# Patient Record
Sex: FEMALE | Race: OTHER | HISPANIC OR LATINO | URBAN - METROPOLITAN AREA
[De-identification: names, ages, dates, MRNs, and addresses within clinical notes are randomized per-mention and may not be internally consistent; named-entity substitution may affect disease eponyms.]

---

## 2022-03-25 ENCOUNTER — EMERGENCY (EMERGENCY)
Facility: HOSPITAL | Age: 72
LOS: 1 days | Discharge: HOME | End: 2022-03-25
Attending: EMERGENCY MEDICINE
Payer: COMMERCIAL

## 2022-03-25 VITALS
SYSTOLIC BLOOD PRESSURE: 134 MMHG | TEMPERATURE: 98 F | RESPIRATION RATE: 17 BRPM | HEIGHT: 64 IN | HEART RATE: 92 BPM | WEIGHT: 139.99 LBS | OXYGEN SATURATION: 96 % | DIASTOLIC BLOOD PRESSURE: 80 MMHG

## 2022-03-25 LAB
ALBUMIN SERPL ELPH-MCNC: 3.3 G/DL — LOW (ref 3.5–5)
ALP SERPL-CCNC: 101 U/L — SIGNIFICANT CHANGE UP (ref 40–120)
ALT FLD-CCNC: 30 U/L DA — SIGNIFICANT CHANGE UP (ref 10–60)
ANION GAP SERPL CALC-SCNC: 5 MMOL/L — SIGNIFICANT CHANGE UP (ref 5–17)
APPEARANCE UR: CLEAR — SIGNIFICANT CHANGE UP
AST SERPL-CCNC: 27 U/L — SIGNIFICANT CHANGE UP (ref 10–40)
BASOPHILS # BLD AUTO: 0.05 K/UL — SIGNIFICANT CHANGE UP (ref 0–0.2)
BASOPHILS NFR BLD AUTO: 0.7 % — SIGNIFICANT CHANGE UP (ref 0–2)
BILIRUB SERPL-MCNC: 0.4 MG/DL — SIGNIFICANT CHANGE UP (ref 0.2–1.2)
BILIRUB UR-MCNC: NEGATIVE — SIGNIFICANT CHANGE UP
BUN SERPL-MCNC: 12 MG/DL — SIGNIFICANT CHANGE UP (ref 7–18)
CALCIUM SERPL-MCNC: 9 MG/DL — SIGNIFICANT CHANGE UP (ref 8.4–10.5)
CHLORIDE SERPL-SCNC: 112 MMOL/L — HIGH (ref 96–108)
CO2 SERPL-SCNC: 24 MMOL/L — SIGNIFICANT CHANGE UP (ref 22–31)
COLOR SPEC: YELLOW — SIGNIFICANT CHANGE UP
CREAT SERPL-MCNC: 1.25 MG/DL — SIGNIFICANT CHANGE UP (ref 0.5–1.3)
DIFF PNL FLD: NEGATIVE — SIGNIFICANT CHANGE UP
EGFR: 46 ML/MIN/1.73M2 — LOW
EOSINOPHIL # BLD AUTO: 0.43 K/UL — SIGNIFICANT CHANGE UP (ref 0–0.5)
EOSINOPHIL NFR BLD AUTO: 6.2 % — HIGH (ref 0–6)
GLUCOSE SERPL-MCNC: 125 MG/DL — HIGH (ref 70–99)
GLUCOSE UR QL: 1000 MG/DL
HCT VFR BLD CALC: 42.9 % — SIGNIFICANT CHANGE UP (ref 34.5–45)
HGB BLD-MCNC: 14.2 G/DL — SIGNIFICANT CHANGE UP (ref 11.5–15.5)
IMM GRANULOCYTES NFR BLD AUTO: 0.3 % — SIGNIFICANT CHANGE UP (ref 0–1.5)
KETONES UR-MCNC: NEGATIVE — SIGNIFICANT CHANGE UP
LEUKOCYTE ESTERASE UR-ACNC: NEGATIVE — SIGNIFICANT CHANGE UP
LYMPHOCYTES # BLD AUTO: 1.71 K/UL — SIGNIFICANT CHANGE UP (ref 1–3.3)
LYMPHOCYTES # BLD AUTO: 24.5 % — SIGNIFICANT CHANGE UP (ref 13–44)
MCHC RBC-ENTMCNC: 30.7 PG — SIGNIFICANT CHANGE UP (ref 27–34)
MCHC RBC-ENTMCNC: 33.1 GM/DL — SIGNIFICANT CHANGE UP (ref 32–36)
MCV RBC AUTO: 92.9 FL — SIGNIFICANT CHANGE UP (ref 80–100)
MONOCYTES # BLD AUTO: 0.57 K/UL — SIGNIFICANT CHANGE UP (ref 0–0.9)
MONOCYTES NFR BLD AUTO: 8.2 % — SIGNIFICANT CHANGE UP (ref 2–14)
NEUTROPHILS # BLD AUTO: 4.21 K/UL — SIGNIFICANT CHANGE UP (ref 1.8–7.4)
NEUTROPHILS NFR BLD AUTO: 60.1 % — SIGNIFICANT CHANGE UP (ref 43–77)
NITRITE UR-MCNC: NEGATIVE — SIGNIFICANT CHANGE UP
NRBC # BLD: 0 /100 WBCS — SIGNIFICANT CHANGE UP (ref 0–0)
PH UR: 6 — SIGNIFICANT CHANGE UP (ref 5–8)
PLATELET # BLD AUTO: 243 K/UL — SIGNIFICANT CHANGE UP (ref 150–400)
POTASSIUM SERPL-MCNC: 4.1 MMOL/L — SIGNIFICANT CHANGE UP (ref 3.5–5.3)
POTASSIUM SERPL-SCNC: 4.1 MMOL/L — SIGNIFICANT CHANGE UP (ref 3.5–5.3)
PROT SERPL-MCNC: 6.6 G/DL — SIGNIFICANT CHANGE UP (ref 6–8.3)
PROT UR-MCNC: 15
RBC # BLD: 4.62 M/UL — SIGNIFICANT CHANGE UP (ref 3.8–5.2)
RBC # FLD: 15.2 % — HIGH (ref 10.3–14.5)
SARS-COV-2 RNA SPEC QL NAA+PROBE: SIGNIFICANT CHANGE UP
SODIUM SERPL-SCNC: 141 MMOL/L — SIGNIFICANT CHANGE UP (ref 135–145)
SP GR SPEC: 1.01 — SIGNIFICANT CHANGE UP (ref 1.01–1.02)
TROPONIN I, HIGH SENSITIVITY RESULT: <3 NG/L — SIGNIFICANT CHANGE UP
UROBILINOGEN FLD QL: NEGATIVE — SIGNIFICANT CHANGE UP
WBC # BLD: 6.99 K/UL — SIGNIFICANT CHANGE UP (ref 3.8–10.5)
WBC # FLD AUTO: 6.99 K/UL — SIGNIFICANT CHANGE UP (ref 3.8–10.5)

## 2022-03-25 PROCEDURE — 84484 ASSAY OF TROPONIN QUANT: CPT

## 2022-03-25 PROCEDURE — 71046 X-RAY EXAM CHEST 2 VIEWS: CPT

## 2022-03-25 PROCEDURE — 99284 EMERGENCY DEPT VISIT MOD MDM: CPT | Mod: 25

## 2022-03-25 PROCEDURE — 80053 COMPREHEN METABOLIC PANEL: CPT

## 2022-03-25 PROCEDURE — 36415 COLL VENOUS BLD VENIPUNCTURE: CPT

## 2022-03-25 PROCEDURE — 96374 THER/PROPH/DIAG INJ IV PUSH: CPT

## 2022-03-25 PROCEDURE — 87635 SARS-COV-2 COVID-19 AMP PRB: CPT

## 2022-03-25 PROCEDURE — 81001 URINALYSIS AUTO W/SCOPE: CPT

## 2022-03-25 PROCEDURE — 71046 X-RAY EXAM CHEST 2 VIEWS: CPT | Mod: 26

## 2022-03-25 PROCEDURE — 99284 EMERGENCY DEPT VISIT MOD MDM: CPT

## 2022-03-25 PROCEDURE — 85025 COMPLETE CBC W/AUTO DIFF WBC: CPT

## 2022-03-25 RX ORDER — SODIUM CHLORIDE 9 MG/ML
1000 INJECTION INTRAMUSCULAR; INTRAVENOUS; SUBCUTANEOUS ONCE
Refills: 0 | Status: COMPLETED | OUTPATIENT
Start: 2022-03-25 | End: 2022-03-25

## 2022-03-25 RX ORDER — ACETAMINOPHEN 500 MG
650 TABLET ORAL ONCE
Refills: 0 | Status: COMPLETED | OUTPATIENT
Start: 2022-03-25 | End: 2022-03-25

## 2022-03-25 RX ORDER — KETOROLAC TROMETHAMINE 30 MG/ML
15 SYRINGE (ML) INJECTION ONCE
Refills: 0 | Status: DISCONTINUED | OUTPATIENT
Start: 2022-03-25 | End: 2022-03-25

## 2022-03-25 RX ADMIN — Medication 650 MILLIGRAM(S): at 16:14

## 2022-03-25 RX ADMIN — Medication 15 MILLIGRAM(S): at 16:14

## 2022-03-25 RX ADMIN — SODIUM CHLORIDE 1000 MILLILITER(S): 9 INJECTION INTRAMUSCULAR; INTRAVENOUS; SUBCUTANEOUS at 16:15

## 2022-03-25 NOTE — ED PROVIDER NOTE - CLINICAL SUMMARY MEDICAL DECISION MAKING FREE TEXT BOX
pt presents with myalgias associated with rhinorrhea and diarrhea.  Afebrile.  abdomen soft on exam.  concern for infectious process.  Will check labs, CXR, UA, COVID swab, and reassess.

## 2022-03-25 NOTE — ED PROVIDER NOTE - NSFOLLOWUPINSTRUCTIONS_ED_ALL_ED_FT
Viral Illness, Adult      Viruses are tiny germs that can get into a person's body and cause illness. There are many different types of viruses, and they cause many types of illness. Viral illnesses can range from mild to severe. They can affect various parts of the body.    Short-term conditions that are caused by a virus include colds and the flu (influenza). Long-term conditions that are caused by a virus include herpes, shingles, and HIV (human immunodeficiency virus) infection. A few viruses have been linked to certain cancers.      What are the causes?    Many types of viruses can cause illness. Viruses invade cells in your body, multiply, and cause the infected cells to work abnormally or die. When these cells die, they release more of the virus. When this happens, you develop symptoms of the illness, and the virus continues to spread to other cells. If the virus takes over the function of the cell, it can cause the cell to divide and grow out of control. This happens when a virus causes cancer.    Different viruses get into the body in different ways. You can get a virus by:  •Swallowing food or water that has come in contact with the virus (is contaminated).      •Breathing in droplets that have been coughed or sneezed into the air by an infected person.      •Touching a surface that has been contaminated with the virus and then touching your eyes, nose, or mouth.      •Being bitten by an insect or animal that carries the virus.      •Having sexual contact with a person who is infected with the virus.      •Being exposed to blood or fluids that contain the virus, either through an open cut or during a transfusion.      If a virus enters your body, your body's defense system (immune system) will try to fight the virus. You may be at higher risk for a viral illness if your immune system is weak.      What are the signs or symptoms?    You may have these symptoms, depending on the type of virus and the location of the cells that it invades:•Cold and flu viruses:  •Fever.      •Headache.      •Sore throat.      •Muscle aches.      •Stuffy nose (nasal congestion).      •Cough.      •Digestive system (gastrointestinal) viruses:  •Fever.      •Pain in the abdomen.      •Nausea.      •Diarrhea.      •Liver viruses (hepatitis):  •Loss of appetite.      •Tiredness.      •Skin or the white parts of your eyes turning yellow (jaundice).      •Brain and spinal cord viruses:  •Fever.      •Headache.      •Stiff neck.      •Nausea and vomiting.      •Confusion or sleepiness.      •Skin viruses:  •Warts.      •Itching.      •Rash.      •Sexually transmitted viruses:  •Discharge.      •Swelling.      •Redness.      •Rash.          How is this diagnosed?    This condition may be diagnosed based on one or more of the following:  •Symptoms.      •Medical history.      •Physical exam.      •Blood test, sample of mucus from your lungs (sputum sample), stool sample, or a swab of body fluids or a skin sore (lesion).        How is this treated?    Viruses can be hard to treat because they live within cells. Antibiotic medicines do not treat viruses because these medicines do not get inside cells. Treatment for a viral illness may include:  •Resting and drinking plenty of fluids.      •Medicines to relieve symptoms. These can include over-the-counter medicine for pain and fever, medicines for cough or congestion, and medicines to relieve diarrhea.      •Antiviral medicines. These medicines are available only for certain types of viruses.      Some viral illnesses can be prevented with vaccinations. A common example is the flu shot.      Follow these instructions at home:    Medicines     •Take over-the-counter and prescription medicines only as told by your health care provider.      •If you were prescribed an antiviral medicine, take it as told by your health care provider. Do not stop taking the antiviral even if you start to feel better.    •Be aware of when antibiotics are needed and when they are not needed. Antibiotics do not treat viruses. You may get an antibiotic if your health care provider thinks that you may have, or are at risk for, a bacterial infection and you have a viral infection.  •Do not ask for an antibiotic prescription if you have been diagnosed with a viral illness. Antibiotics will not make your illness go away faster.      •Frequently taking antibiotics when they are not needed can lead to antibiotic resistance. When this develops, the medicine no longer works against the bacteria that it normally fights.          General instructions      •Drink enough fluids to keep your urine pale yellow.      •Rest as much as possible.      •Return to your normal activities as told by your health care provider. Ask your health care provider what activities are safe for you.      •Keep all follow-up visits as told by your health care provider. This is important.        How is this prevented?     To reduce your risk of viral illness:  •Wash your hands often with soap and water for at least 20 seconds. If soap and water are not available, use hand .      •Avoid touching your nose, eyes, and mouth, especially if you have not washed your hands recently.      •If anyone in your household has a viral infection, clean all household surfaces that may have been in contact with the virus. Use soap and hot water. You may also use bleach that you have added water to (diluted).      •Stay away from people who are sick with symptoms of a viral infection.      •Do not share items such as toothbrushes and water bottles with other people.      •Keep your vaccinations up to date. This includes getting a yearly flu shot.      •Eat a healthy diet and get plenty of rest.        Contact a health care provider if:    •You have symptoms of a viral illness that do not go away.      •Your symptoms come back after going away.      •Your symptoms get worse.        Get help right away if you have:    •Trouble breathing.      •A severe headache or a stiff neck.      •Severe vomiting or pain in your abdomen.      These symptoms may represent a serious problem that is an emergency. Do not wait to see if the symptoms will go away. Get medical help right away. Call your local emergency services (911 in the [place] Do not drive yourself to the hospital.       Summary    •Viruses are types of germs that can get into a person's body and cause illness. Viral illnesses can range from mild to severe. They can affect various parts of the body.      •Viruses can be hard to treat. There are medicines to relieve symptoms, and there are some antiviral medicines.      •If you were prescribed an antiviral medicine, take it as told by your health care provider. Do not stop taking the antiviral even if you start to feel better.      •Contact a health care provider if you have symptoms of a viral illness that do not go away.      This information is not intended to replace advice given to you by your health care provider. Make sure you discuss any questions you have with your health care provider.      Document Revised: 05/03/2021 Document Reviewed: 10/27/2020    Elsevier Patient Education © 2022 [employer]

## 2022-03-25 NOTE — ED PROVIDER NOTE - PATIENT PORTAL LINK FT
You can access the FollowMyHealth Patient Portal offered by [facility] by registering at the following website: [email or web address]. By joining [facility]’s FollowMyHealth portal, you will also be able to view your health information using other applications (apps) compatible with our system.

## 2022-03-25 NOTE — ED ADULT TRIAGE NOTE - DATE OF LAST VACCINATION
Home o2 ordered through [facility] [place]. They will bring a portable tank for pt.   [facility]  Ph: [phone]  Fax: [phone]  Electronically signed by Azul Contreras on 1/26/2021 at 12:42 PM 25-Feb-2022

## 2022-03-25 NOTE — ED PROVIDER NOTE - OBJECTIVE STATEMENT
[age]y/o female with PMHx of DM and HLD presents with myalgias, rhinorrhea, and diarrhea since yesterday.  Patient is COVID vaccinated and has received a booster.  As per  patient, yesterday she awoke with pain to her joints (arms and legs).  She was also having a lot of rhinorrhea.  Today, the joint pain has continued, and now patient with diarrhea (non-bloody).  pt denies fever, chest pain, abdominal pain, nausea, or vomiting.  No shortness of breath.

## 2023-02-27 PROBLEM — Z00.00 ENCOUNTER FOR PREVENTIVE HEALTH EXAMINATION: Status: ACTIVE | Noted: 2023-02-27

## 2023-07-06 ENCOUNTER — APPOINTMENT (OUTPATIENT)
Dept: NEUROSURGERY | Facility: CLINIC | Age: 73
End: 2023-07-06
Payer: MEDICARE

## 2023-07-06 VITALS
HEART RATE: 59 BPM | BODY MASS INDEX: 23.05 KG/M2 | WEIGHT: 135 LBS | TEMPERATURE: 97.8 F | DIASTOLIC BLOOD PRESSURE: 85 MMHG | HEIGHT: 64 IN | SYSTOLIC BLOOD PRESSURE: 129 MMHG | OXYGEN SATURATION: 98 %

## 2023-07-06 DIAGNOSIS — Z78.9 OTHER SPECIFIED HEALTH STATUS: ICD-10-CM

## 2023-07-06 DIAGNOSIS — Z86.39 PERSONAL HISTORY OF OTHER ENDOCRINE, NUTRITIONAL AND METABOLIC DISEASE: ICD-10-CM

## 2023-07-06 DIAGNOSIS — Z87.898 PERSONAL HISTORY OF OTHER SPECIFIED CONDITIONS: ICD-10-CM

## 2023-07-06 PROCEDURE — 99203 OFFICE O/P NEW LOW 30 MIN: CPT

## 2023-07-06 RX ORDER — ATORVASTATIN CALCIUM 10 MG/1
10 TABLET, FILM COATED ORAL
Refills: 0 | Status: ACTIVE | COMMUNITY

## 2023-07-06 RX ORDER — EMPAGLIFLOZIN 25 MG/1
25 TABLET, FILM COATED ORAL
Refills: 0 | Status: ACTIVE | COMMUNITY

## 2023-07-06 RX ORDER — LAMOTRIGINE 25 MG/1
25 TABLET ORAL
Refills: 0 | Status: ACTIVE | COMMUNITY

## 2023-07-06 RX ORDER — DONEPEZIL HYDROCHLORIDE 5 MG/1
5 TABLET ORAL
Refills: 0 | Status: ACTIVE | COMMUNITY

## 2023-07-06 NOTE — HISTORY OF PRESENT ILLNESS
[FreeTextEntry1] : Brain mass [de-identified] : Mrs. Fournier is a 74 y/o, female, with a hx of HLD, DM II, and brain tumor, here to establish care as a new patient. She reports that in 09/22 she underwent MRI brain due to memory loss x 2 years, MRI brain illustrated mass of the medial anterior left temporal lobe measuring approximately 11 x 10 x 11 mm, no mass effect, or shift. She experienced 10 lb weight loss unintentional, but regained weight. She denies vision changes, seizures, n/v, SOB, CP.

## 2023-07-06 NOTE — RESULTS/DATA
[FreeTextEntry1] : DESCRIPTION:   Minimal prominence of the ventricles and subarachnoid spaces. No evidence of hydrocephalus.  Mild periventricular, subcortical chronic ischemic white matter change.   Intermediate T1, slight increased FLAIR, T2 signal mass overlies the medial anterior left temporal lobe measuring approximately 11 x 10 x 11 mm in AP x transverse x coronal dimension. Minimal underlying gyral effacement. No extra-axial collection or mass otherwise.   Normal intracranial vascular flow void is maintained. Normal pituitary.  The 7th 8th nerve complexes, cochlea vestibular structures. Right-sided cataract surgery.    Normal signal void is seen throughout the paranasal sinuses and temporal bones. No calvarial, extracalvarial, partially seen facial, parotid abnormality.    Transverse ligament thickening almost abuts the anterior cervical medullary junction.

## 2023-07-06 NOTE — ASSESSMENT
[FreeTextEntry1] : Mrs. Fournier is a 72 y/o, female, with small L temporal lobe lesion most likely a meningioma. No concerning deficits noted on exam.\par Will obtain new MRI brain w/wout contrast for evaluation and r/o growth of mass.